# Patient Record
Sex: FEMALE | Race: WHITE | URBAN - NONMETROPOLITAN AREA
[De-identification: names, ages, dates, MRNs, and addresses within clinical notes are randomized per-mention and may not be internally consistent; named-entity substitution may affect disease eponyms.]

---

## 2021-12-17 ENCOUNTER — OFFICE VISIT (OUTPATIENT)
Dept: URBAN - NONMETROPOLITAN AREA CLINIC 4 | Facility: CLINIC | Age: 61
End: 2021-12-17
Payer: COMMERCIAL

## 2021-12-17 ENCOUNTER — LAB OUTSIDE AN ENCOUNTER (OUTPATIENT)
Dept: URBAN - NONMETROPOLITAN AREA CLINIC 4 | Facility: CLINIC | Age: 61
End: 2021-12-17

## 2021-12-17 VITALS
BODY MASS INDEX: 28.63 KG/M2 | TEMPERATURE: 97.5 F | HEART RATE: 86 BPM | WEIGHT: 161.6 LBS | HEIGHT: 63 IN | DIASTOLIC BLOOD PRESSURE: 76 MMHG | SYSTOLIC BLOOD PRESSURE: 148 MMHG

## 2021-12-17 DIAGNOSIS — Z12.11 COLON CANCER SCREENING: ICD-10-CM

## 2021-12-17 DIAGNOSIS — K59.09 CHRONIC CONSTIPATION: ICD-10-CM

## 2021-12-17 PROCEDURE — 99203 OFFICE O/P NEW LOW 30 MIN: CPT | Performed by: REGISTERED NURSE

## 2021-12-17 RX ORDER — LOSARTAN POTASSIUM 50 MG/1
1 TABLET TABLET ORAL ONCE A DAY
Status: ACTIVE | COMMUNITY

## 2021-12-17 RX ORDER — ALBUTEROL SULFATE 90 UG/1
1 PUFF AS NEEDED AEROSOL, METERED RESPIRATORY (INHALATION)
Status: ACTIVE | COMMUNITY

## 2021-12-17 RX ORDER — FLUTICASONE PROPIONATE AND SALMETEROL XINAFOATE 45; 21 UG/1; UG/1
2 PUFFS AEROSOL, METERED RESPIRATORY (INHALATION) TWICE A DAY
Status: ACTIVE | COMMUNITY

## 2021-12-17 RX ORDER — LINACLOTIDE 145 UG/1
1 CAPSULE AT LEAST 30 MINUTES BEFORE THE FIRST MEAL OF THE DAY ON AN EMPTY STOMACH CAPSULE, GELATIN COATED ORAL ONCE A DAY
Qty: 30 CAPSULE | Refills: 3

## 2021-12-17 RX ORDER — ESOMEPRAZOLE MAGNESIUM 40 MG/1
1 CAPSULE CAPSULE, DELAYED RELEASE ORAL ONCE A DAY
Status: ACTIVE | COMMUNITY

## 2021-12-17 RX ORDER — LINACLOTIDE 145 UG/1
1 CAPSULE AT LEAST 30 MINUTES BEFORE THE FIRST MEAL OF THE DAY ON AN EMPTY STOMACH CAPSULE, GELATIN COATED ORAL ONCE A DAY
Status: ACTIVE | COMMUNITY

## 2021-12-17 RX ORDER — SODIUM SULFATE, MAGNESIUM SULFATE, AND POTASSIUM CHLORIDE 17.75; 2.7; 2.25 G/1; G/1; G/1
12 TABLETS TABLET ORAL
Qty: 24 TABLETS | Refills: 0 | OUTPATIENT
Start: 2021-12-17 | End: 2021-12-18

## 2021-12-17 NOTE — HPI-TODAY'S VISIT:
12/17/21: Pt is a 62 yo female with PMH of chronic constipation, anxiety, asthma, arthritis, GERD, HTN who was referred by Gaye Rothman NP, for colonoscopy.  A copy of this document will be sent to the referring physician.  Patient has had a colonoscopy in 2004 that was reportedly normal. There is no family history of colon cancer. Mother had colon polyps. Patient has chronic constipation. She is taking Linzess 145 mcg daily which helps.  Patient denies change in appetite and weight. Patient denies bleeding per rectum. Not on any blood thinners.

## 2022-01-20 ENCOUNTER — OFFICE VISIT (OUTPATIENT)
Dept: URBAN - METROPOLITAN AREA SURGERY CENTER 14 | Facility: SURGERY CENTER | Age: 62
End: 2022-01-20
Payer: COMMERCIAL

## 2022-01-20 DIAGNOSIS — Z12.11 AVERAGE RISK FOR CRC. DUE TO PT'S CO-MORBID STATE WITH END STAGE DEMENTIA, HIGH RISK FOR ANESTHESIA (PER NEUROLOGY); INABILITY TO TAKE A BOWEL PREP....WOULD NOT ADVISE ANY COLORECTAL CANCER SCREENING INCLUDING STOOL TEST FOR FECAL BLOOD.: ICD-10-CM

## 2022-01-20 DIAGNOSIS — K62.1 ANAL AND RECTAL POLYP: ICD-10-CM

## 2022-01-20 DIAGNOSIS — D12.4 ADENOMA OF DESCENDING COLON: ICD-10-CM

## 2022-01-20 PROCEDURE — G8907 PT DOC NO EVENTS ON DISCHARG: HCPCS | Performed by: INTERNAL MEDICINE

## 2022-01-20 PROCEDURE — 45385 COLONOSCOPY W/LESION REMOVAL: CPT | Performed by: INTERNAL MEDICINE

## 2022-01-20 RX ORDER — LOSARTAN POTASSIUM 50 MG/1
1 TABLET TABLET ORAL ONCE A DAY
Status: ACTIVE | COMMUNITY

## 2022-01-20 RX ORDER — ESOMEPRAZOLE MAGNESIUM 40 MG/1
1 CAPSULE CAPSULE, DELAYED RELEASE ORAL ONCE A DAY
Status: ACTIVE | COMMUNITY

## 2022-01-20 RX ORDER — ALBUTEROL SULFATE 90 UG/1
1 PUFF AS NEEDED AEROSOL, METERED RESPIRATORY (INHALATION)
Status: ACTIVE | COMMUNITY

## 2022-01-20 RX ORDER — LINACLOTIDE 145 UG/1
1 CAPSULE AT LEAST 30 MINUTES BEFORE THE FIRST MEAL OF THE DAY ON AN EMPTY STOMACH CAPSULE, GELATIN COATED ORAL ONCE A DAY
Qty: 30 CAPSULE | Refills: 3 | Status: ACTIVE | COMMUNITY

## 2022-01-20 RX ORDER — FLUTICASONE PROPIONATE AND SALMETEROL XINAFOATE 45; 21 UG/1; UG/1
2 PUFFS AEROSOL, METERED RESPIRATORY (INHALATION) TWICE A DAY
Status: ACTIVE | COMMUNITY

## 2022-02-03 ENCOUNTER — WEB ENCOUNTER (OUTPATIENT)
Dept: URBAN - NONMETROPOLITAN AREA CLINIC 4 | Facility: CLINIC | Age: 62
End: 2022-02-03

## 2022-02-03 ENCOUNTER — OFFICE VISIT (OUTPATIENT)
Dept: URBAN - NONMETROPOLITAN AREA CLINIC 4 | Facility: CLINIC | Age: 62
End: 2022-02-03
Payer: COMMERCIAL

## 2022-02-03 VITALS
BODY MASS INDEX: 29.2 KG/M2 | HEART RATE: 85 BPM | DIASTOLIC BLOOD PRESSURE: 80 MMHG | TEMPERATURE: 98.2 F | HEIGHT: 63 IN | SYSTOLIC BLOOD PRESSURE: 143 MMHG | WEIGHT: 164.8 LBS

## 2022-02-03 DIAGNOSIS — K59.09 CHRONIC CONSTIPATION: ICD-10-CM

## 2022-02-03 DIAGNOSIS — Z86.010 PERSONAL HISTORY OF COLONIC POLYPS: ICD-10-CM

## 2022-02-03 DIAGNOSIS — K57.90 DIVERTICULOSIS: ICD-10-CM

## 2022-02-03 DIAGNOSIS — D12.6 TUBULAR ADENOMA OF COLON: ICD-10-CM

## 2022-02-03 PROCEDURE — 99213 OFFICE O/P EST LOW 20 MIN: CPT | Performed by: REGISTERED NURSE

## 2022-02-03 RX ORDER — ESOMEPRAZOLE MAGNESIUM 40 MG/1
1 CAPSULE CAPSULE, DELAYED RELEASE ORAL ONCE A DAY
Status: ACTIVE | COMMUNITY

## 2022-02-03 RX ORDER — ALBUTEROL SULFATE 90 UG/1
1 PUFF AS NEEDED AEROSOL, METERED RESPIRATORY (INHALATION)
Status: ACTIVE | COMMUNITY

## 2022-02-03 RX ORDER — FLUTICASONE PROPIONATE AND SALMETEROL XINAFOATE 45; 21 UG/1; UG/1
2 PUFFS AEROSOL, METERED RESPIRATORY (INHALATION) TWICE A DAY
Status: ACTIVE | COMMUNITY

## 2022-02-03 RX ORDER — LOSARTAN POTASSIUM 50 MG/1
1 TABLET TABLET ORAL ONCE A DAY
Status: ACTIVE | COMMUNITY

## 2022-02-03 RX ORDER — LINACLOTIDE 145 UG/1
1 CAPSULE AT LEAST 30 MINUTES BEFORE THE FIRST MEAL OF THE DAY ON AN EMPTY STOMACH CAPSULE, GELATIN COATED ORAL ONCE A DAY
Qty: 30 CAPSULE | Refills: 3 | Status: ACTIVE | COMMUNITY

## 2022-02-03 NOTE — HPI-TODAY'S VISIT:
12/17/21: Pt is a 62 yo female with PMH of chronic constipation, anxiety, asthma, arthritis, GERD, HTN who was referred by Gaye Rothman NP, for colonoscopy.  A copy of this document will be sent to the referring physician.  Patient has had a colonoscopy in 2004 that was reportedly normal. There is no family history of colon cancer. Mother had colon polyps. Patient has chronic constipation. She is taking Linzess 145 mcg daily which helps.  Patient denies change in appetite and weight. Patient denies bleeding per rectum. Not on any blood thinners.  2/3/22: Pt RTC for f/u. Had cscope 1/20/22: - Internal hemorrhoids. - Two 2 to 3 mm polyps in the distal rectum and in the descending colon, removed with a cold snare. Resected and retrieved. - Diverticulosis in the sigmoid colon. - The examined portion of the ileum was normal. Bx c/w TA polyp in DC. Rectal polyp hyperplastic. Rpt in 5 yrs.  Denies any current GI complaints. Remains on Linzess 145 mcg and doing well.

## 2022-04-04 ENCOUNTER — TELEPHONE ENCOUNTER (OUTPATIENT)
Dept: URBAN - NONMETROPOLITAN AREA CLINIC 4 | Facility: CLINIC | Age: 62
End: 2022-04-04

## 2022-04-04 RX ORDER — LINACLOTIDE 145 UG/1
1 CAPSULE AT LEAST 30 MINUTES BEFORE THE FIRST MEAL OF THE DAY ON AN EMPTY STOMACH CAPSULE, GELATIN COATED ORAL ONCE A DAY
Qty: 30 CAPSULE | Refills: 3
End: 2022-08-02

## 2022-11-28 ENCOUNTER — TELEPHONE ENCOUNTER (OUTPATIENT)
Dept: URBAN - METROPOLITAN AREA CLINIC 92 | Facility: CLINIC | Age: 62
End: 2022-11-28

## 2022-12-17 NOTE — PHYSICAL EXAM GASTROINTESTINAL
Abdomen , soft, nontender, nondistended , no guarding or rigidity , no masses palpable , normal bowel sounds , Liver and Spleen , no hepatomegaly present , no hepatosplenomegaly , liver nontender , spleen not palpable CC:   Chief Complaint   Patient presents with   • Cough     X 3 days   • Sinus Problem     C/O sinus pressure on frontal sinuses and ear x since yesterday    • Fever     C/O fever  and chills x last night        SUBJECTIVE:    Marianela Mcintyre is a 56 year old female who presents [facility] with respiratory symptoms with the first symptoms noticed several days ago. Symptoms include: chills, cough , ear ache bilateral, facial pressure, nasal congestion and nasal discharge. Denies: abdominal pain, appetite loss, chills, fever, nausea, sweats, vomiting or wheezing.     The remainder of the ROS is negative.    Patient Active Problem List   Diagnosis   • Bruising   • Selective IgM immunodeficiency (CMS/HCC)   • High triglycerides     ALLERGIES:  Sulfa antibiotics    OBJECTIVE:    Vitals:    12/17/22 0807   BP: (!) 142/78   Pulse: (!) 114   Resp: 16   Temp: 98.3 °F (36.8 °C)   SpO2: 96%      Gen: Alert, well hydrated, in no apparent distress  Ears: TM's intact without erythema, bulging, retraction, or fluid-level. External auditory canal clear  Conjunctiva: clear without injection or discharge  Nose/Sinus: sinus tenderness  Neck: supple without cervical adenopathy. No meningismus  Heart: regular rate and rhythm without murmur, rub or gallop  Lungs: clear to auscultation without wheezes, rhonchi or rales  Skin: smooth without rash, edema, redness or increased warmth    COVID-19 rapid test negative. Results were independently reviewed and interpreted and discussed during patient evaluation  INFLUENZA: NEGATIVE. Results were independently reviewed and interpreted and discussed with patient. INCLUDES BOTH TYPE A AND B FLU.    ASSESSMENT/PLAN:    SINUSITIS ACUTE SINUSITIS - Advised to use antibiotics as directed. May use tylenol as directed for pain or fever. Nasal saline may help break up mucous and speed recovery. Increase hydration.OTC cough medication and additional symptoms management may be performed as discussed.   If sinus symptoms are worsening or not improving after 5-7 days of antibiotic therapy follow-up with your primary care provider or return to [facility] for reevaluation.    Diagnosis and prognosis, treatment and side-effects were explained and all questions were answered satisfactorily and there were no further questions upon discharge.    Electronically signed by: Kane Garcia DO  12/17/2022

## 2023-02-03 ENCOUNTER — TELEPHONE ENCOUNTER (OUTPATIENT)
Dept: URBAN - METROPOLITAN AREA CLINIC 92 | Facility: CLINIC | Age: 63
End: 2023-02-03

## 2023-02-03 ENCOUNTER — OFFICE VISIT (OUTPATIENT)
Dept: URBAN - NONMETROPOLITAN AREA CLINIC 4 | Facility: CLINIC | Age: 63
End: 2023-02-03
Payer: COMMERCIAL

## 2023-02-03 ENCOUNTER — LAB OUTSIDE AN ENCOUNTER (OUTPATIENT)
Dept: URBAN - NONMETROPOLITAN AREA CLINIC 4 | Facility: CLINIC | Age: 63
End: 2023-02-03

## 2023-02-03 VITALS
BODY MASS INDEX: 29.55 KG/M2 | DIASTOLIC BLOOD PRESSURE: 81 MMHG | WEIGHT: 166.8 LBS | HEIGHT: 63 IN | HEART RATE: 94 BPM | SYSTOLIC BLOOD PRESSURE: 141 MMHG | TEMPERATURE: 97.9 F

## 2023-02-03 DIAGNOSIS — K59.09 CHRONIC CONSTIPATION: ICD-10-CM

## 2023-02-03 DIAGNOSIS — Z79.1 NSAID LONG-TERM USE: ICD-10-CM

## 2023-02-03 DIAGNOSIS — K21.9 GASTROESOPHAGEAL REFLUX DISEASE, UNSPECIFIED WHETHER ESOPHAGITIS PRESENT: ICD-10-CM

## 2023-02-03 DIAGNOSIS — R10.11 RUQ PAIN: ICD-10-CM

## 2023-02-03 DIAGNOSIS — D12.6 TUBULAR ADENOMA OF COLON: ICD-10-CM

## 2023-02-03 DIAGNOSIS — Z86.010 PERSONAL HISTORY OF COLONIC POLYPS: ICD-10-CM

## 2023-02-03 DIAGNOSIS — K57.90 DIVERTICULOSIS: ICD-10-CM

## 2023-02-03 PROBLEM — 428283002: Status: ACTIVE | Noted: 2022-02-03

## 2023-02-03 PROBLEM — 397881000: Status: ACTIVE | Noted: 2022-02-03

## 2023-02-03 PROCEDURE — 99214 OFFICE O/P EST MOD 30 MIN: CPT | Performed by: REGISTERED NURSE

## 2023-02-03 RX ORDER — ESOMEPRAZOLE MAGNESIUM 40 MG/1
1 CAPSULE CAPSULE, DELAYED RELEASE ORAL ONCE A DAY
Status: DISCONTINUED | COMMUNITY

## 2023-02-03 RX ORDER — LOSARTAN POTASSIUM 50 MG/1
1 TABLET TABLET ORAL ONCE A DAY
Status: ACTIVE | COMMUNITY

## 2023-02-03 RX ORDER — FLUTICASONE PROPIONATE AND SALMETEROL XINAFOATE 45; 21 UG/1; UG/1
2 PUFFS AEROSOL, METERED RESPIRATORY (INHALATION) TWICE A DAY
Status: ACTIVE | COMMUNITY

## 2023-02-03 RX ORDER — LINACLOTIDE 145 UG/1
1 CAPSULE AT LEAST 30 MINUTES BEFORE THE FIRST MEAL OF THE DAY ON AN EMPTY STOMACH CAPSULE, GELATIN COATED ORAL ONCE A DAY
Qty: 30 CAPSULE | Refills: 3
End: 2023-06-03

## 2023-02-03 RX ORDER — PANTOPRAZOLE SODIUM 40 MG/1
1 TABLET TABLET, DELAYED RELEASE ORAL ONCE A DAY
Status: ACTIVE | COMMUNITY

## 2023-02-03 RX ORDER — ALBUTEROL SULFATE 90 UG/1
1 PUFF AS NEEDED AEROSOL, METERED RESPIRATORY (INHALATION)
Status: ACTIVE | COMMUNITY

## 2023-02-03 NOTE — HPI-TODAY'S VISIT:
12/17/21: Pt is a 60 yo female with PMH of chronic constipation, anxiety, asthma, arthritis, GERD, HTN who was referred by Gaye Rothman NP, for colonoscopy.  A copy of this document will be sent to the referring physician.  Patient has had a colonoscopy in 2004 that was reportedly normal. There is no family history of colon cancer. Mother had colon polyps. Patient has chronic constipation. She is taking Linzess 145 mcg daily which helps.  Patient denies change in appetite and weight. Patient denies bleeding per rectum. Not on any blood thinners.  2/3/22: Pt RTC for f/u. Had cscope 1/20/22: - Internal hemorrhoids. - Two 2 to 3 mm polyps in the distal rectum and in the descending colon, removed with a cold snare. Resected and retrieved. - Diverticulosis in the sigmoid colon. - The examined portion of the ileum was normal. Bx c/w TA polyp in DC. Rectal polyp hyperplastic. Rpt in 5 yrs.  Denies any current GI complaints. Remains on Linzess 145 mcg and doing well.  2/3/23: Pt RTC for annual follow up. Reports intermittent RUQ pain for past couple of months. Pain is aching in nature. No aggravating factors. Admits to taking Advil frequently for this and arthritis pain. Denies any melena or hematochezia. Also reports worsening GERD over past year. Wakes during the night with symptoms. Tries to avoid trigger foods. Has gained 18 lbs in past 2 years. PCP swithced her from Nexium to Protonix last year and she takes Pepcid at night, but continues to have daily symptoms. Last EGD in 2004. Continues to c/o constipation. She has been off Linzess since March 2022. Has been taking Senna and Dulcolax supp.

## 2023-02-16 ENCOUNTER — TELEPHONE ENCOUNTER (OUTPATIENT)
Dept: URBAN - METROPOLITAN AREA CLINIC 92 | Facility: CLINIC | Age: 63
End: 2023-02-16

## 2023-02-16 ENCOUNTER — CLAIMS CREATED FROM THE CLAIM WINDOW (OUTPATIENT)
Dept: URBAN - METROPOLITAN AREA SURGERY CENTER 14 | Facility: SURGERY CENTER | Age: 63
End: 2023-02-16

## 2023-02-16 ENCOUNTER — CLAIMS CREATED FROM THE CLAIM WINDOW (OUTPATIENT)
Dept: URBAN - METROPOLITAN AREA SURGERY CENTER 14 | Facility: SURGERY CENTER | Age: 63
End: 2023-02-16
Payer: COMMERCIAL

## 2023-02-16 DIAGNOSIS — R10.11 ABDOMINAL BURNING SENSATION IN RIGHT UPPER QUADRANT: ICD-10-CM

## 2023-02-16 DIAGNOSIS — K22.89 DILATATION OF ESOPHAGUS: ICD-10-CM

## 2023-02-16 DIAGNOSIS — K29.60 ADENOPAPILLOMATOSIS GASTRICA: ICD-10-CM

## 2023-02-16 PROCEDURE — 43239 EGD BIOPSY SINGLE/MULTIPLE: CPT | Performed by: INTERNAL MEDICINE

## 2023-02-16 PROCEDURE — G8907 PT DOC NO EVENTS ON DISCHARG: HCPCS | Performed by: INTERNAL MEDICINE

## 2023-02-16 RX ORDER — LINACLOTIDE 145 UG/1
1 CAPSULE AT LEAST 30 MINUTES BEFORE THE FIRST MEAL OF THE DAY ON AN EMPTY STOMACH CAPSULE, GELATIN COATED ORAL ONCE A DAY
Qty: 30 CAPSULE | Refills: 3 | Status: ACTIVE | COMMUNITY
End: 2023-06-03

## 2023-02-16 RX ORDER — LOSARTAN POTASSIUM 50 MG/1
1 TABLET TABLET ORAL ONCE A DAY
Status: ACTIVE | COMMUNITY

## 2023-02-16 RX ORDER — PANTOPRAZOLE SODIUM 40 MG/1
1 TABLET TABLET, DELAYED RELEASE ORAL ONCE A DAY
Status: ACTIVE | COMMUNITY

## 2023-02-16 RX ORDER — ALBUTEROL SULFATE 90 UG/1
1 PUFF AS NEEDED AEROSOL, METERED RESPIRATORY (INHALATION)
Status: ACTIVE | COMMUNITY

## 2023-02-16 RX ORDER — FLUTICASONE PROPIONATE AND SALMETEROL XINAFOATE 45; 21 UG/1; UG/1
2 PUFFS AEROSOL, METERED RESPIRATORY (INHALATION) TWICE A DAY
Status: ACTIVE | COMMUNITY

## 2023-03-30 ENCOUNTER — OFFICE VISIT (OUTPATIENT)
Dept: URBAN - NONMETROPOLITAN AREA CLINIC 4 | Facility: CLINIC | Age: 63
End: 2023-03-30
Payer: COMMERCIAL

## 2023-03-30 ENCOUNTER — DASHBOARD ENCOUNTERS (OUTPATIENT)
Age: 63
End: 2023-03-30

## 2023-03-30 VITALS
WEIGHT: 166.4 LBS | TEMPERATURE: 98.1 F | DIASTOLIC BLOOD PRESSURE: 69 MMHG | HEIGHT: 63 IN | HEART RATE: 88 BPM | BODY MASS INDEX: 29.48 KG/M2 | SYSTOLIC BLOOD PRESSURE: 120 MMHG

## 2023-03-30 DIAGNOSIS — R10.11 RUQ PAIN: ICD-10-CM

## 2023-03-30 DIAGNOSIS — Z86.010 PERSONAL HISTORY OF COLONIC POLYPS: ICD-10-CM

## 2023-03-30 DIAGNOSIS — K29.50 CHRONIC GASTRITIS WITHOUT BLEEDING, UNSPECIFIED GASTRITIS TYPE: ICD-10-CM

## 2023-03-30 DIAGNOSIS — D12.6 TUBULAR ADENOMA OF COLON: ICD-10-CM

## 2023-03-30 DIAGNOSIS — R11.0 NAUSEA: ICD-10-CM

## 2023-03-30 DIAGNOSIS — K21.9 GASTROESOPHAGEAL REFLUX DISEASE, UNSPECIFIED WHETHER ESOPHAGITIS PRESENT: ICD-10-CM

## 2023-03-30 DIAGNOSIS — K57.90 DIVERTICULOSIS: ICD-10-CM

## 2023-03-30 PROBLEM — 235595009: Status: ACTIVE | Noted: 2023-02-03

## 2023-03-30 PROBLEM — 8493009: Status: ACTIVE | Noted: 2023-03-30

## 2023-03-30 PROCEDURE — 99214 OFFICE O/P EST MOD 30 MIN: CPT | Performed by: REGISTERED NURSE

## 2023-03-30 RX ORDER — ALBUTEROL SULFATE 90 UG/1
1 PUFF AS NEEDED AEROSOL, METERED RESPIRATORY (INHALATION)
Status: ACTIVE | COMMUNITY

## 2023-03-30 RX ORDER — LOSARTAN POTASSIUM 50 MG/1
1 TABLET TABLET ORAL ONCE A DAY
Status: ACTIVE | COMMUNITY

## 2023-03-30 RX ORDER — PANTOPRAZOLE SODIUM 40 MG/1
1 TABLET TABLET, DELAYED RELEASE ORAL ONCE A DAY
Status: ACTIVE | COMMUNITY

## 2023-03-30 RX ORDER — ONDANSETRON HYDROCHLORIDE 4 MG/1
1 TABLET TABLET, FILM COATED ORAL EVERY 8 HOURS
Qty: 90 TABLET | Refills: 0 | OUTPATIENT
Start: 2023-03-30

## 2023-03-30 RX ORDER — LINACLOTIDE 145 UG/1
1 CAPSULE AT LEAST 30 MINUTES BEFORE THE FIRST MEAL OF THE DAY ON AN EMPTY STOMACH CAPSULE, GELATIN COATED ORAL ONCE A DAY
Qty: 30 CAPSULE | Refills: 3 | Status: ACTIVE | COMMUNITY
End: 2023-06-03

## 2023-03-30 RX ORDER — FLUTICASONE PROPIONATE AND SALMETEROL XINAFOATE 45; 21 UG/1; UG/1
2 PUFFS AEROSOL, METERED RESPIRATORY (INHALATION) TWICE A DAY
Status: ACTIVE | COMMUNITY

## 2023-03-30 NOTE — HPI-TODAY'S VISIT:
12/17/21: Pt is a 60 yo female with PMH of chronic constipation, anxiety, asthma, arthritis, GERD, HTN who was referred by Gaye Rothman NP, for colonoscopy.  A copy of this document will be sent to the referring physician.  Patient has had a colonoscopy in 2004 that was reportedly normal. There is no family history of colon cancer. Mother had colon polyps. Patient has chronic constipation. She is taking Linzess 145 mcg daily which helps.  Patient denies change in appetite and weight. Patient denies bleeding per rectum. Not on any blood thinners.  2/3/22: Pt RTC for f/u. Had cscope 1/20/22: - Internal hemorrhoids. - Two 2 to 3 mm polyps in the distal rectum and in the descending colon, removed with a cold snare. Resected and retrieved. - Diverticulosis in the sigmoid colon. - The examined portion of the ileum was normal. Bx c/w TA polyp in DC. Rectal polyp hyperplastic. Rpt in 5 yrs.  Denies any current GI complaints. Remains on Linzess 145 mcg and doing well.  2/3/23: Pt RTC for annual follow up. Reports intermittent RUQ pain for past couple of months. Pain is aching in nature. No aggravating factors. Admits to taking Advil frequently for this and arthritis pain. Denies any melena or hematochezia. Also reports worsening GERD over past year. Wakes during the night with symptoms. Tries to avoid trigger foods. Has gained 18 lbs in past 2 years. PCP swithced her from Nexium to Protonix last year and she takes Pepcid at night, but continues to have daily symptoms. Last EGD in 2004. Continues to c/o constipation. She has been off Linzess since March 2022. Has been taking Senna and Dulcolax supp.  3/30/23: Pt RTC for f/u. Had EGD 2/16/23: - The examined portions of the nasopharynx, oropharynx and larynx were normal. - Small hiatal hernia. - LA Grade A reflux esophagitis. - GERD, as evident by intermittent free flow of gastric contents into the esophagus. - Normal stomach. Biopsied. - Normal examined duodenum. Bx c/w mild chronic gastritis.   She reports persistent nausea that started a couple of weeks ago. Denies vomiting. Reports RUQ pain, severe nausea and diarrhea last Friday. Has been taking Pepto Bismol and Ematrol with no relief.

## 2023-05-19 ENCOUNTER — LAB OUTSIDE AN ENCOUNTER (OUTPATIENT)
Dept: URBAN - METROPOLITAN AREA CLINIC 54 | Facility: CLINIC | Age: 63
End: 2023-05-19